# Patient Record
Sex: MALE | Race: WHITE | NOT HISPANIC OR LATINO | Employment: FULL TIME | URBAN - METROPOLITAN AREA
[De-identification: names, ages, dates, MRNs, and addresses within clinical notes are randomized per-mention and may not be internally consistent; named-entity substitution may affect disease eponyms.]

---

## 2017-03-11 ENCOUNTER — OFFICE VISIT (OUTPATIENT)
Dept: URGENT CARE | Facility: MEDICAL CENTER | Age: 52
End: 2017-03-11
Payer: COMMERCIAL

## 2017-03-11 PROCEDURE — 99203 OFFICE O/P NEW LOW 30 MIN: CPT

## 2017-03-16 ENCOUNTER — OFFICE VISIT (OUTPATIENT)
Dept: URGENT CARE | Facility: MEDICAL CENTER | Age: 52
End: 2017-03-16
Payer: COMMERCIAL

## 2017-03-16 PROCEDURE — 99213 OFFICE O/P EST LOW 20 MIN: CPT

## 2018-01-18 NOTE — PROGRESS NOTES
Assessment    1  Shingles (053 9) (B02 9)    Plan  Shingles    · ValACYclovir HCl - 1 GM Oral Tablet; TAKE 1 TABLET 3 TIMES DAILY    Discussion/Summary  Discussion Summary:   I prescribed valacyclovir 1 g 3 times a day for 7 days  I advised patient to avoid scratching vesicles since they are potentially contagious  He may take ibuprofen as needed for pain  Chief Complaint    1  Rash  Chief Complaint Free Text Note Form: Pt with red rash on chest below right breast and right mid back area for 3 days  Describes as "sensitive"  History of Present Illness  HPI: Patient here with a rash around rest area and right lower back for the last 3 days  First noticed a rash around the right periareolar area which seemed to look like bug bite  However since then it is spread  He describes some areas as having hypersensitivity  He is not applying any ointment to the rash  Denies any itching  Describes discomfort  Denies any fever or chills  Denies any fatigue  Hospital Based Practices Required Assessment:   Pain Assessment   the patient states they have pain  The pain is located in the rash chest and back  The patient describes the pain as dull  (on a scale of 0 to 10, the patient rates the pain at 4 )   Abuse And Domestic Violence Screen   Domestic violence screen not done today  Reason DV Screen not done: wife in room    Depression And Suicide Screen  Suicide screen not done today  Reason suicide screen not done: wife in room  Prefered Language is  english  Primary Language is  english  Review of Systems  Focused-Male:   Constitutional: no fever or chills, feels well, no tiredness, no recent weight loss or weight gain  Cardiovascular: no complaints of slow or fast heart rate, no chest pain, no palpitations, no leg claudication or lower extremity edema  Respiratory: no complaints of shortness of breath, no wheezing or cough, no dyspnea on exertion, no orthopnea or PND     Gastrointestinal: no complaints of abdominal pain, no constipation, no nausea or vomiting, no diarrhea or bloody stools  Past Medical History    1  No pertinent past medical history  Active Problems And Past Medical History Reviewed: The active problems and past medical history were reviewed and updated today  Social History    · Denies alcohol consumption (V49 89) (Z78 9)   · Never smoker    Current Meds   1  No Reported Medications Recorded  Medication List Reviewed: The medication list was reviewed and updated today  Allergies    1  No Known Drug Allergies    Vitals  Signs   Recorded: [id number] 05:43PM   Temperature: 97 8 F  Heart Rate: 104  Respiration: 20  Systolic: 230  Diastolic: 90  Height: 6 ft 1 in  Weight: 221 lb   BMI Calculated: 29 16  BSA Calculated: 2 25  O2 Saturation: 99  Pain Scale: 4    Physical Exam    Constitutional   General appearance: No acute distress, well appearing and well nourished  Pulmonary   Respiratory effort: No increased work of breathing or signs of respiratory distress  Auscultation of lungs: Clear to auscultation  Skin Right posterior thorax and right anterior chest reveals multiple vesicular lesion with erythematous base  Findings are consistent with shingles        Signatures   Electronically signed by : LELE Gayle ; Mar 11 2017  5:58PM EST                       (Author)

## 2019-12-16 ENCOUNTER — OFFICE VISIT (OUTPATIENT)
Dept: PODIATRY | Facility: CLINIC | Age: 54
End: 2019-12-16
Payer: COMMERCIAL

## 2019-12-16 VITALS
HEIGHT: 73 IN | BODY MASS INDEX: 28.1 KG/M2 | DIASTOLIC BLOOD PRESSURE: 82 MMHG | WEIGHT: 212 LBS | SYSTOLIC BLOOD PRESSURE: 172 MMHG | HEART RATE: 103 BPM

## 2019-12-16 DIAGNOSIS — E11.65 UNCONTROLLED TYPE 2 DIABETES MELLITUS WITH HYPERGLYCEMIA (HCC): Primary | ICD-10-CM

## 2019-12-16 PROCEDURE — 99213 OFFICE O/P EST LOW 20 MIN: CPT | Performed by: PODIATRIST

## 2019-12-16 RX ORDER — ATORVASTATIN CALCIUM 20 MG/1
20 TABLET, FILM COATED ORAL DAILY
COMMUNITY
Start: 2019-07-10 | End: 2020-07-09

## 2019-12-16 RX ORDER — LISINOPRIL 10 MG/1
10 TABLET ORAL DAILY
COMMUNITY
Start: 2019-07-10 | End: 2020-07-09

## 2019-12-16 RX ORDER — METFORMIN HYDROCHLORIDE 750 MG/1
750 TABLET, EXTENDED RELEASE ORAL
COMMUNITY
Start: 2019-11-26 | End: 2020-11-25

## 2019-12-16 RX ORDER — FENOFIBRATE 145 MG/1
145 TABLET, COATED ORAL DAILY
COMMUNITY
Start: 2019-07-10 | End: 2020-07-09

## 2019-12-16 NOTE — PATIENT INSTRUCTIONS
Foot Care for People with Diabetes   WHAT YOU NEED TO KNOW:   · Foot care helps protect your feet and prevent foot ulcers or sores  Long-term high blood sugar levels can damage the blood vessels and nerves in your legs and feet  This damage makes it hard to feel pressure, pain, temperature, and touch  You may not be able to feel a cut or sore, or shoes that are too tight  Foot care is needed to prevent serious problems, such as an infection or amputation  · Diabetes may cause your toes to become crooked or curved under  These changes may affect the way you walk and can lead to increased pressure on your foot  The pressure can decrease blood flow to your feet  Lack of blood flow increases your risk for a foot ulcer  Do not ignore small problems, such as dry skin or small wounds  These can become life-threatening over time without proper care  DISCHARGE INSTRUCTIONS:   Contact your healthcare provider if:   · Your feet become numb, weak, or hard to move  · You have pus draining from a sore on your foot  · You have a wound on your foot that gets bigger, deeper, or does not heal      · You see blisters, cuts, scratches, calluses, or sores on your foot  · You have a fever, and your feet become red, warm, and swollen  · Your toenails become thick, curled, or yellow  · You find it hard to check your feet because your vision is poor  · You have questions or concerns about your condition or care  Foot care:   · Check your feet each day  Look at your whole foot, including the bottom, and between and under your toes  Check for wounds, corns, and calluses  Use a mirror to see the bottom of your feet  The skin on your feet may be shiny, tight, or darker than normal  Your feet may also be cold and pale  Feel your feet by running your hands along the tops, bottoms, sides, and between your toes  Redness, swelling, and warmth are signs of blood flow problems that can lead to a foot ulcer   Do not try to remove corns or calluses yourself  · Wash your feet each day with soap and warm water  Do not use hot water, because this can injure your foot  Dry your feet gently with a towel after you wash them  Dry between and under your toes  · Apply lotion or a moisturizer on your dry feet  Ask your healthcare provider what lotions are best to use  Do not put lotion or moisturizer between your toes  · Cut your toenails correctly  File or cut your toenails straight across  Use a soft brush to clean around your toenails  If your toenails are very thick, you may need to have a healthcare provider or specialist cut them  · Protect your feet  Do not walk barefoot or wear your shoes without socks  Check your shoes for rocks or other objects that can hurt your feet  Wear cotton socks to help keep your feet dry  Wear socks without toe seams, or wear them with the seams inside out  Change your socks each day  Do not wear socks that are dirty or damp  · Wear shoes that fit well  Wear shoes that do not rub against any area of your feet  Your shoes should be ½ to ¾ inch (1 to 2 centimeters) longer than your feet  Your shoes should also have extra space around the widest part of your feet  Walking or athletic shoes with laces or straps that adjust are best  Ask your healthcare provider for help to choose shoes that fit you best  Ask him if you need to wear an insert, orthotic, or bandage on your feet  Follow up with your healthcare provider or foot specialist as directed: You will need to have your feet checked at least once a year  You may need a foot exam more often if you have nerve damage, foot deformities, or ulcers  Write down your questions so you remember to ask them during your visits  © 2017 [employer] Information is for End User's use only and may not be sold, redistributed or otherwise used for commercial purposes   All illustrations and images included in CareNotes® are the copyrighted property of [employer]  or Pierce Cavanaugh  The above information is an  only  It is not intended as medical advice for individual conditions or treatments  Talk to your doctor, nurse or pharmacist before following any medical regimen to see if it is safe and effective for you

## 2019-12-16 NOTE — PROGRESS NOTES
Assessment/Plan:       Diagnoses and all orders for this visit:    Uncontrolled type 2 diabetes mellitus with hyperglycemia ([address] )    Other orders  -     Albuterol Sulfate 108 (90 Base) MCG/ACT AEPB; Inhale 180 mcg every 4 (four) hours  -     atorvastatin (LIPITOR) 20 mg tablet; Take 20 mg by mouth daily  -     fenofibrate (TRICOR) 145 mg tablet; Take 145 mg by mouth daily  -     linaGLIPtin 5 MG TABS; Take 5 mg by mouth daily  -     lisinopril (ZESTRIL) 10 mg tablet; Take 10 mg by mouth daily  -     metFORMIN (GLUCOPHAGE-XR) 750 mg 24 hr tablet; Take 750 mg by mouth      -Educated on DM risk to lower extremities, proper shoe gear, and daily monitoring of feet    -Educated on A1C and the risks of poorly controlled Diabetes   -Discussed weight loss and suitable exercise regiment  - Patient has stable neurovascular status on his lower extremities  He is actively working with his PCP to get better BG control  Recommend annual DM foot examination for now unless new concerns arise  Subjective:      Patient ID: Li Martínez is a 53 y o  male  PAtient presents for annual DM foot exam  His A1C in July was 7 8 but went up to 10  He had stopped his metformin because of diarrhea side effects  He and his PCP are trying to re-establish a good medical regiment for his BG control  He denies numbness or burning in his feet  The following portions of the patient's history were reviewed and updated as appropriate: allergies, current medications, past family history, past medical history, past social history, past surgical history and problem list     Review of Systems      Objective:      BP (!) 172/82   Pulse 103   Ht 6' 1" (1 854 m)   Wt 96 2 kg (212 lb)   BMI 27 97 kg/m²          Physical Exam   Constitutional: He appears well-developed and well-nourished  No distress  Cardiovascular: Pulses are no weak pulses  Pulses:       Dorsalis pedis pulses are 2+ on the right side, and 2+ on the left side  Posterior tibial pulses are 2+ on the right side, and 2+ on the left side  Musculoskeletal:        Feet:    Feet:   Right Foot:   Skin Integrity: Negative for ulcer, callus or dry skin  Left Foot:   Skin Integrity: Negative for ulcer, callus or dry skin  Psychiatric: His mood appears not anxious  He is not agitated  Vitals reviewed  Diabetic Foot Exam    Patient's shoes and socks removed  Right Foot/Ankle   Right Foot Inspection  Skin Exam: skin intact no dry skin, no callus, no ulcer and no callus                          Toe Exam: no swelling, no tenderness, erythema and  no right toe deformity  Sensory   Vibration: diminished  Proprioception: intact   Monofilament testing: intact  Vascular  Capillary refills: < 3 seconds  The right DP pulse is 2+  The right PT pulse is 2+  Left Foot/Ankle  Left Foot Inspection  Skin Exam: skin intactno dry skin, no ulcer and no callus                         Toe Exam: no swelling, no tenderness, no erythema and no left toe deformity                   Sensory   Vibration: diminished  Proprioception: intact  Monofilament: intact  Vascular  Capillary refills: < 3 seconds  The left DP pulse is 2+  The left PT pulse is 2+  Assign Risk Category:  No deformity present; No loss of protective sensation;  No weak pulses       Risk: 0

## 2020-06-08 ENCOUNTER — OFFICE VISIT (OUTPATIENT)
Dept: PODIATRY | Facility: CLINIC | Age: 55
End: 2020-06-08
Payer: COMMERCIAL

## 2020-06-08 VITALS
WEIGHT: 224.2 LBS | BODY MASS INDEX: 29.72 KG/M2 | SYSTOLIC BLOOD PRESSURE: 140 MMHG | HEIGHT: 73 IN | HEART RATE: 76 BPM | DIASTOLIC BLOOD PRESSURE: 85 MMHG

## 2020-06-08 DIAGNOSIS — L03.032: Primary | ICD-10-CM

## 2020-06-08 PROCEDURE — 11730 AVULSION NAIL PLATE SIMPLE 1: CPT | Performed by: PODIATRIST

## 2020-06-08 PROCEDURE — 99212 OFFICE O/P EST SF 10 MIN: CPT | Performed by: PODIATRIST

## 2020-06-08 RX ORDER — LIDOCAINE HYDROCHLORIDE 10 MG/ML
3 INJECTION, SOLUTION INFILTRATION; PERINEURAL ONCE
Status: COMPLETED | OUTPATIENT
Start: 2020-06-08 | End: 2020-06-08

## 2020-06-08 RX ADMIN — LIDOCAINE HYDROCHLORIDE 3 ML: 10 INJECTION, SOLUTION INFILTRATION; PERINEURAL at 09:19

## 2020-06-29 ENCOUNTER — EVALUATION (OUTPATIENT)
Dept: PHYSICAL THERAPY | Facility: MEDICAL CENTER | Age: 55
End: 2020-06-29
Payer: COMMERCIAL

## 2020-06-29 ENCOUNTER — TRANSCRIBE ORDERS (OUTPATIENT)
Dept: PHYSICAL THERAPY | Facility: MEDICAL CENTER | Age: 55
End: 2020-06-29

## 2020-06-29 DIAGNOSIS — M75.02 ADHESIVE BURSITIS OF LEFT SHOULDER: Primary | ICD-10-CM

## 2020-06-29 PROCEDURE — 97140 MANUAL THERAPY 1/> REGIONS: CPT | Performed by: PHYSICAL THERAPIST

## 2020-06-29 PROCEDURE — 97161 PT EVAL LOW COMPLEX 20 MIN: CPT | Performed by: PHYSICAL THERAPIST

## 2020-06-29 PROCEDURE — 97110 THERAPEUTIC EXERCISES: CPT | Performed by: PHYSICAL THERAPIST

## 2020-07-02 ENCOUNTER — OFFICE VISIT (OUTPATIENT)
Dept: PHYSICAL THERAPY | Facility: MEDICAL CENTER | Age: 55
End: 2020-07-02
Payer: COMMERCIAL

## 2020-07-02 DIAGNOSIS — M75.02 ADHESIVE BURSITIS OF LEFT SHOULDER: Primary | ICD-10-CM

## 2020-07-02 PROCEDURE — 97110 THERAPEUTIC EXERCISES: CPT | Performed by: PHYSICAL THERAPIST

## 2020-07-02 PROCEDURE — 97140 MANUAL THERAPY 1/> REGIONS: CPT | Performed by: PHYSICAL THERAPIST

## 2020-07-02 PROCEDURE — 97112 NEUROMUSCULAR REEDUCATION: CPT | Performed by: PHYSICAL THERAPIST

## 2020-07-02 NOTE — PROGRESS NOTES
Daily Note     Today's date: 2020  Patient name: Basim Friend  : 1965  MRN: [id number]  Referring provider: Don Vásquez  Dx:   Encounter Diagnosis     ICD-10-CM    1  Adhesive bursitis of left shoulder M75 02          Subjective: Pt reports that he tweaked his shoulder when he dropped a 70# box today  He notes that up until that time his shoulder was feeling better  Objective: See treatment diary below    Pt was able to maintain the improvements in PROM he achieved during his last PT visit  Assessment: Tolerated treatment well  Patient demonstrated fatigue post treatment, exhibited good technique with therapeutic exercises and would benefit from continued PT  Plan: Continue per plan of care        Precautions: h/o diabetes    Daily Treatment Diary     Manual             [facility] jt mobs all planes  HK            PROM HK                                                       Exercise Diary             UBE NV 3F/3B           Scap 4 IP            UE alphabet NV Sup  2X           SL ER NV 3x15           Prone pro/ret NV 3x10           Pulleys  6'                                                                                                                                                                                                     Modalities              MH 15'

## 2020-07-06 ENCOUNTER — OFFICE VISIT (OUTPATIENT)
Dept: PHYSICAL THERAPY | Facility: MEDICAL CENTER | Age: 55
End: 2020-07-06
Payer: COMMERCIAL

## 2020-07-06 DIAGNOSIS — M75.02 ADHESIVE BURSITIS OF LEFT SHOULDER: Primary | ICD-10-CM

## 2020-07-06 PROCEDURE — 97110 THERAPEUTIC EXERCISES: CPT

## 2020-07-06 PROCEDURE — 97140 MANUAL THERAPY 1/> REGIONS: CPT

## 2020-07-06 PROCEDURE — 97112 NEUROMUSCULAR REEDUCATION: CPT

## 2020-07-06 NOTE — PROGRESS NOTES
Daily Note     Today's date: 2020  Patient name: Dorothea Oliveira  : 1965  MRN: [id number]  Referring provider: Erik Buck  Dx:   Encounter Diagnosis     ICD-10-CM    1  Adhesive bursitis of left shoulder M75 02                   Subjective: Pt reports that his shoulder felt good through the weekend, but felt stiff after waking today  Objective: See treatment diary below      Assessment: Tolerated treatment well  Patient demonstrated fatigue post treatment, exhibited good technique with therapeutic exercises and would benefit from continued PT  Issue TB and ex's at [place]  Did not issue today 2* a moderate amount of cuing required w/these ex's  Plan: Continue per plan of care        Precautions: h/o diabetes    Daily Treatment Diary     Manual            [facility] jt mobs all planes  HK            PROM HK  KO                                                     Exercise Diary            UBE NV 3F/3B 3F/3B          Scap 4 IP  Red  3x10          UE alphabet NV Sup  2X Sup  3x          SL ER NV 3x15 3x15          Prone pro/ret NV 3x10 3x10          Pulleys  6' 6'                                                                                                                                                                                                    Modalities              MH 15'

## 2020-07-13 ENCOUNTER — OFFICE VISIT (OUTPATIENT)
Dept: PHYSICAL THERAPY | Facility: MEDICAL CENTER | Age: 55
End: 2020-07-13
Payer: COMMERCIAL

## 2020-07-13 DIAGNOSIS — M75.02 ADHESIVE BURSITIS OF LEFT SHOULDER: Primary | ICD-10-CM

## 2020-07-13 PROCEDURE — 97112 NEUROMUSCULAR REEDUCATION: CPT

## 2020-07-13 PROCEDURE — 97140 MANUAL THERAPY 1/> REGIONS: CPT

## 2020-07-13 PROCEDURE — 97110 THERAPEUTIC EXERCISES: CPT

## 2020-07-13 NOTE — PROGRESS NOTES
Daily Note     Today's date: 2020  Patient name: Trevor Mcdaniel  : 1965  MRN: [id number]  Referring provider: Veronica Levine  Dx:   Encounter Diagnosis     ICD-10-CM    1  Adhesive bursitis of left shoulder M75 02                   Subjective: Pt reports that his mobility and strength are improving  Objective: See treatment diary below      Assessment: Tolerated treatment well  Patient demonstrated fatigue post treatment, exhibited good technique with therapeutic exercises and would benefit from continued PT  Pt's mobility is improving and is making progress towards his goals  Plan: Continue per plan of care        Precautions: h/o diabetes    Daily Treatment Diary     Manual           [address] [facility] jt mobs all planes  HK            PROM HK  KO KO                                                    Exercise Diary           UBE NV 3F/3B 3F/3B 3F/3B         Scap 4 IP  Red  3x10 Red  3x15           UE alphabet NV Sup  2X Sup  3x Sup  3x         SL ER NV 3x15 3x15 1#  3x10         Prone pro/ret NV 3x10 3x10 3x10         Pulleys  6' 6' 6'         Wall slides    Supine  3x10                                                                                                                                                                                      Modalities              MH 15'

## 2020-07-16 ENCOUNTER — OFFICE VISIT (OUTPATIENT)
Dept: PHYSICAL THERAPY | Facility: MEDICAL CENTER | Age: 55
End: 2020-07-16
Payer: COMMERCIAL

## 2020-07-16 DIAGNOSIS — M75.02 ADHESIVE BURSITIS OF LEFT SHOULDER: Primary | ICD-10-CM

## 2020-07-16 PROCEDURE — 97110 THERAPEUTIC EXERCISES: CPT

## 2020-07-16 PROCEDURE — 97140 MANUAL THERAPY 1/> REGIONS: CPT

## 2020-07-16 PROCEDURE — 97112 NEUROMUSCULAR REEDUCATION: CPT

## 2020-07-16 NOTE — PROGRESS NOTES
Daily Note     Today's date: 2020  Patient name: Aminata See  : 1965  MRN: [id number]  Referring provider: Abimbola Chen  Dx:   Encounter Diagnosis     ICD-10-CM    1  Adhesive bursitis of left shoulder M75 02                   Subjective: Pt states that his shoulder is feeling ok and no changes since LV  Objective: See treatment diary below      Assessment: Tolerated treatment well  Patient demonstrated fatigue post treatment, exhibited good technique with therapeutic exercises and would benefit from continued PT  Pt's mobility is improving gradually  Progress ex's at NV if able  Plan: Continue per plan of care        Precautions: h/o diabetes    Daily Treatment Diary     Manual          [address] [facility] j mobs all planes  HK            PROM HK  KO KO KO                                                   Exercise Diary          UBE NV 3F/3B 3F/3B 3F/3B 3F/3B        Scap 4 IP  Red  3x10 Red  3x15   Green  3x10        UE alphabet NV Sup  2X Sup  3x Sup  3x Sup  3x  1#        SL ER NV 3x15 3x15 1#  3x10 1#  3x10        Prone pro/ret NV 3x10 3x10 3x10 3x10        Pulleys  6' 6' 6' 6'        Wall slides    Supine  3x10 Supine  3x10        Prone raises     3x10                                                                                                                                                                        Modalities              MH 15'

## 2020-07-20 ENCOUNTER — OFFICE VISIT (OUTPATIENT)
Dept: PHYSICAL THERAPY | Facility: MEDICAL CENTER | Age: 55
End: 2020-07-20
Payer: COMMERCIAL

## 2020-07-20 DIAGNOSIS — M75.02 ADHESIVE BURSITIS OF LEFT SHOULDER: Primary | ICD-10-CM

## 2020-07-20 PROCEDURE — 97110 THERAPEUTIC EXERCISES: CPT | Performed by: PHYSICAL THERAPIST

## 2020-07-20 PROCEDURE — 97140 MANUAL THERAPY 1/> REGIONS: CPT | Performed by: PHYSICAL THERAPIST

## 2020-07-20 PROCEDURE — 97112 NEUROMUSCULAR REEDUCATION: CPT | Performed by: PHYSICAL THERAPIST

## 2020-07-20 NOTE — PROGRESS NOTES
Daily Note     Today's date: 2020  Patient name: Lalit Sellers  : 1965  MRN: [id number]  Referring provider: Joaquín Bird  Dx:   Encounter Diagnosis     ICD-10-CM    1  Adhesive bursitis of left shoulder M75 02        Subjective: Pt reports that his shoulder ROM is improved and his shoulder is feeling pretty good  Objective: See treatment diary below    + L AC jt hypomobility     Assessment: Tolerated treatment well  Patient demonstrated fatigue post treatment, exhibited good technique with therapeutic exercises and would benefit from continued PT  Plan: Continue per plan of care        Precautions: h/o diabetes    Daily Treatment Diary     Manual         [address] jt mobs all planes  HK     HK       PROM HK  KO KO KO HK       AC jt mobs      HK                                     Exercise Diary         UBE NV 3F/3B 3F/3B 3F/3B 3F/3B 3F/3B       Scap 4 IP  Red  3x10 Red  3x15   Green  3x10 Green  3x15       UE alphabet NV Sup  2X Sup  3x Sup  3x Sup  3x  1# Sup  3X  2#       SL ER NV 3x15 3x15 1#  3x10 1#  3x10 2#  3x10       Prone pro/ret NV 3x10 3x10 3x10 3x10 3x10       Pulleys  6' 6' 6' 6' 6'       Wall slides    Supine  3x10 Supine  3x10 Sup  3x12       Prone raises     3x10 3x10                                                                                                                                                                       Modalities              MH 15'

## 2020-07-23 ENCOUNTER — OFFICE VISIT (OUTPATIENT)
Dept: PHYSICAL THERAPY | Facility: MEDICAL CENTER | Age: 55
End: 2020-07-23
Payer: COMMERCIAL

## 2020-07-23 DIAGNOSIS — M75.02 ADHESIVE BURSITIS OF LEFT SHOULDER: Primary | ICD-10-CM

## 2020-07-23 PROCEDURE — 97140 MANUAL THERAPY 1/> REGIONS: CPT

## 2020-07-23 PROCEDURE — 97110 THERAPEUTIC EXERCISES: CPT

## 2020-07-23 PROCEDURE — 97112 NEUROMUSCULAR REEDUCATION: CPT

## 2020-07-23 NOTE — PROGRESS NOTES
Daily Note     Today's date: 2020  Patient name: Dorothea Oliveira  : 1965  MRN: [id number]  Referring provider: Glen Todd  Dx:   Encounter Diagnosis     ICD-10-CM    1  Adhesive bursitis of left shoulder M75 02                   Subjective: Pt reports that his shoulder is beginning to feel better  Objective: See treatment diary below      Assessment: Tolerated treatment well  Patient demonstrated fatigue post treatment, exhibited good technique with therapeutic exercises and would benefit from continued PTPt is responding well to current tx plan  Pts scapular control and mobility are improving  Plan: Continue per plan of care        Precautions: h/o diabetes    Daily Treatment Diary     Manual        [facility] jt mobs all planes  HK     HK       PROM HK  KO KO KO HK KO      AC jt mobs      HK                                     Exercise Diary        UBE NV 3F/3B 3F/3B 3F/3B 3F/3B 3F/3B 3F/3B      Scap 4 IP  Red  3x10 Red  3x15   Green  3x10 Green  3x15 Green  3x15      UE alphabet NV Sup  2X Sup  3x Sup  3x Sup  3x  1# Sup  3X  2# Sup  3x  2#      SL ER NV 3x15 3x15 1#  3x10 1#  3x10 2#  3x10 2#  3x12      Prone pro/ret NV 3x10 3x10 3x10 3x10 3x10 3x10      Pulleys  6' 6' 6' 6' 6' 6'      Wall slides    Supine  3x10 Supine  3x10 Sup  3x12 Sup  3x12      Prone raises     3x10 3x10 3x10                                                                                                                                                                      Modalities              MH 15'

## 2020-07-27 ENCOUNTER — OFFICE VISIT (OUTPATIENT)
Dept: PHYSICAL THERAPY | Facility: MEDICAL CENTER | Age: 55
End: 2020-07-27
Payer: COMMERCIAL

## 2020-07-27 DIAGNOSIS — M75.02 ADHESIVE BURSITIS OF LEFT SHOULDER: Primary | ICD-10-CM

## 2020-07-27 PROCEDURE — 97110 THERAPEUTIC EXERCISES: CPT | Performed by: PHYSICAL THERAPIST

## 2020-07-27 PROCEDURE — 97112 NEUROMUSCULAR REEDUCATION: CPT | Performed by: PHYSICAL THERAPIST

## 2020-07-27 PROCEDURE — 97140 MANUAL THERAPY 1/> REGIONS: CPT | Performed by: PHYSICAL THERAPIST

## 2020-07-27 NOTE — PROGRESS NOTES
Daily Note     Today's date: 2020  Patient name: Lin Mendosa  : 1965  MRN: [id number]  Referring provider: Syed Dixon  Dx:   Encounter Diagnosis     ICD-10-CM    1  Adhesive bursitis of left shoulder M75 02                   Subjective: Pt reports that his shoulder is feeling good  He feels like his motion is significantly improved and he is not having any pain  Objective: See treatment diary below      Assessment: Tolerated treatment well  Patient demonstrated fatigue post treatment, exhibited good technique with therapeutic exercises and would benefit from continued PT  Pt is doing very well with the current tx plan  Plan: Continue per plan of care        Precautions: h/o diabetes    Daily Treatment Diary     Manual       [facility] jt mobs all planes  HK     HK  HK     PROM [address] HK KO HK     AC jt mobs      HK  HK                                   Exercise Diary       UBE NV 3F/3B 3F/3B 3F/3B 3F/3B 3F/3B 3F/3B 3F/3B     Scap 4 IP  Red  3x10 Red  3x15   Green  3x10 Green  3x15 Green  3x15 Blue  3x10     UE alphabet NV Sup  2X Sup  3x Sup  3x Sup  3x  1# Sup  3X  2# Sup  3x  2# Stand  3x     SL ER NV 3x15 3x15 1#  3x10 1#  3x10 2#  3x10 2#  3x12 2#  3x12     Prone pro/ret NV 3x10 3x10 3x10 3x10 3x10 3x10 3x10     Pulleys  6' 6' 6' 6' 6' 6' 6'     Wall slides    Supine  3x10 Supine  3x10 Sup  3x12 Sup  3x12 Sup  3x12     Prone raises     3x10 3x10 3x10 3x10                                                                                                                                                                     Modalities              MH 15

## 2020-07-30 ENCOUNTER — OFFICE VISIT (OUTPATIENT)
Dept: PHYSICAL THERAPY | Facility: MEDICAL CENTER | Age: 55
End: 2020-07-30
Payer: COMMERCIAL

## 2020-07-30 DIAGNOSIS — M75.02 ADHESIVE BURSITIS OF LEFT SHOULDER: Primary | ICD-10-CM

## 2020-07-30 PROCEDURE — 97140 MANUAL THERAPY 1/> REGIONS: CPT | Performed by: PHYSICAL THERAPIST

## 2020-07-30 PROCEDURE — 97110 THERAPEUTIC EXERCISES: CPT | Performed by: PHYSICAL THERAPIST

## 2020-07-30 NOTE — PROGRESS NOTES
Daily Note     Today's date: 2020  Patient name: Collin Green  : 1965  MRN: [id number]  Referring provider: Colonel Camejo  Dx:   Encounter Diagnosis     ICD-10-CM    1  Adhesive bursitis of left shoulder M75 02             Subjective:  Pt reports that his shoulders are very fatigued today after moving a lot of stuff today at work  Objective: See treatment diary below    Ther ex was not performed today secondary to pts reports of extreme fatigue  Assessment: Tolerated treatment well  Patient would benefit from continued PT      Plan: Continue per plan of care        Precautions: h/o diabetes    Daily Treatment Diary     Manual      [address] jt mobs all planes  HK     HK  HK HK    PROM HK  KO KO KO HK KO HK HK    AC jt mobs      HK  HK NP                                  Exercise Diary      UBE NV 3F/3B 3F/3B 3F/3B 3F/3B 3F/3B 3F/3B 3F/3B 3F/3B    Scap 4 IP  Red  3x10 Red  3x15   Green  3x10 Green  3x15 Green  3x15 Blue  3x10 NP    UE alphabet NV Sup  2X Sup  3x Sup  3x Sup  3x  1# Sup  3X  2# Sup  3x  2# Stand  3x NP    SL ER NV 3x15 3x15 1#  3x10 1#  3x10 2#  3x10 2#  3x12 2#  3x12 NP    Prone pro/ret NV 3x10 3x10 3x10 3x10 3x10 3x10 3x10 NP    Pulleys  6' 6' 6' 6' 6' 6' 6 6'    Wall slides    Supine  3x10 Supine  3x10 Sup  3x12 Sup  3x12 Sup  3x12 NP    Prone raises     3x10 3x10 3x10 3x10 NP                                                                                                                                                                    Modalities              MH 15'

## 2020-08-03 ENCOUNTER — OFFICE VISIT (OUTPATIENT)
Dept: PHYSICAL THERAPY | Facility: MEDICAL CENTER | Age: 55
End: 2020-08-03
Payer: COMMERCIAL

## 2020-08-03 DIAGNOSIS — M75.02 ADHESIVE BURSITIS OF LEFT SHOULDER: Primary | ICD-10-CM

## 2020-08-03 PROCEDURE — 97110 THERAPEUTIC EXERCISES: CPT

## 2020-08-03 PROCEDURE — 97112 NEUROMUSCULAR REEDUCATION: CPT

## 2020-08-03 PROCEDURE — 97140 MANUAL THERAPY 1/> REGIONS: CPT

## 2020-08-03 NOTE — PROGRESS NOTES
Daily Note     Today's date: 8/3/2020  Patient name: Dang Snow  : 1965  MRN: [id number]  Referring provider: Eros Barbosa  Dx:   Encounter Diagnosis     ICD-10-CM    1  Adhesive bursitis of left shoulder  M75 02                   Subjective: Pt reports that his shoulder is a little sore, but not as sore as LV  Objective: See treatment diary below      Assessment: Tolerated treatment well  Patient demonstrated fatigue post treatment, exhibited good technique with therapeutic exercises and would benefit from continued PT  Pt is responding well to current tx plan  Plan: Continue per plan of care        Precautions: h/o diabetes    Daily Treatment Diary     Manual   8/3   [address] all planes  HK     HK  HK HK    PROM HK  KO KO KO HK KO HK HK KO   AC jt mobs      HK  HK NP                                  Exercise Diary   8/3   UBE NV 3F/3B 3F/3B 3F/3B 3F/3B 3F/3B 3F/3B 3F/3B 3F/3B 3F/3B   Scap 4 IP  Red  3x10 Red  3x15   Green  3x10 Green  3x15 Green  3x15 Blue  3x10 NP Blue  3x10   UE alphabet NV Sup  2X Sup  3x Sup  3x Sup  3x  1# Sup  3X  2# Sup  3x  2# Stand  3x NP Stand  3x   SL ER NV 3x15 3x15 1#  3x10 1#  3x10 2#  3x10 2#  3x12 2#  3x12 NP 2#  3x12   Prone pro/ret NV 3x10 3x10 3x10 3x10 3x10 3x10 3x10 NP 3x10   Pulleys  6' 6' 6' 6' 6' 6' 6 6   Wall slides    Supine  3x10 Supine  3x10 Sup  3x12 Sup  3x12 Sup  3x12 NP Sup  3x12   Prone raises     3x10 3x10 3x10 3x10 NP 3x10                                                                                                                                                                   Modalities              MH 15

## 2020-08-06 ENCOUNTER — OFFICE VISIT (OUTPATIENT)
Dept: PHYSICAL THERAPY | Facility: MEDICAL CENTER | Age: 55
End: 2020-08-06
Payer: COMMERCIAL

## 2020-08-06 DIAGNOSIS — M75.02 ADHESIVE BURSITIS OF LEFT SHOULDER: Primary | ICD-10-CM

## 2020-08-06 PROCEDURE — 97112 NEUROMUSCULAR REEDUCATION: CPT

## 2020-08-06 PROCEDURE — 97110 THERAPEUTIC EXERCISES: CPT

## 2020-08-06 PROCEDURE — 97140 MANUAL THERAPY 1/> REGIONS: CPT

## 2020-08-06 NOTE — PROGRESS NOTES
Daily Note     Today's date: 2020  Patient name: Lin Mendosa  : 1965  MRN: [id number]  Referring provider: Silke Hannah  Dx:   Encounter Diagnosis     ICD-10-CM    1  Adhesive bursitis of left shoulder  M75 02                   Subjective: Pt states that his shoulder continues to progress and comments that the Dr  Was very happy with his progress  Objective: See treatment diary below      Assessment: Tolerated treatment well  Patient demonstrated fatigue post treatment, exhibited good technique with therapeutic exercises and would benefit from continued PT  Pt's mobility has improved a great deal and improved scapular activation as well with ex's  Plan: Continue per plan of care        Precautions: h/o diabetes    Daily Treatment Diary     Manual              GH jt mobs all planes              PROM             AC jt mobs                                           Exercise Diary              UBE 3F/3B            Scap 4 Blue  3x12            UE alphabet Stand  3x            SL ER 2#  3x15            Prone pro/ret 3x10            Pulleys 6'            Wall slides Stand  3x10            Prone raises 3x10                                                                                                                                                                            Modalities

## 2020-08-10 ENCOUNTER — OFFICE VISIT (OUTPATIENT)
Dept: PHYSICAL THERAPY | Facility: MEDICAL CENTER | Age: 55
End: 2020-08-10
Payer: COMMERCIAL

## 2020-08-10 DIAGNOSIS — M75.02 ADHESIVE BURSITIS OF LEFT SHOULDER: Primary | ICD-10-CM

## 2020-08-10 PROCEDURE — 97140 MANUAL THERAPY 1/> REGIONS: CPT

## 2020-08-10 PROCEDURE — 97110 THERAPEUTIC EXERCISES: CPT

## 2020-08-10 PROCEDURE — 97112 NEUROMUSCULAR REEDUCATION: CPT

## 2020-08-10 NOTE — PROGRESS NOTES
Daily Note     Today's date: 8/10/2020  Patient name: Lin Mendosa  : 1965  MRN: [id number]  Referring provider: Silke Hannah  Dx:   Encounter Diagnosis     ICD-10-CM    1  Adhesive bursitis of left shoulder  M75 02                   Subjective: Pt reports his shoulder is feeling better  Objective: See treatment diary below      Assessment: Tolerated treatment well  Patient demonstrated fatigue post treatment, exhibited good technique with therapeutic exercises and would benefit from continued PT        Plan: Continue per plan of care  Potential discharge next visit       Precautions: h/o diabetes    Daily Treatment Diary     Manual  8/6 8/10           GH jt mobs all planes              PROM  KO           AC jt mobs                                           Exercise Diary  8/6 8/10           UBE 3F/3B 3F/3B           Scap 4 Blue  3x12 Blue  3x15           UE alphabet Stand  3x Stand  3x  1#           SL ER 2#  3x15 3#  3x15           Prone pro/ret 3x10 3x10           Pulleys 6' 6'           Wall slides Stand  3x10 Stand  3x10           Prone raises 3x10 3x10                                                                                                                                                                           Modalities

## 2020-08-13 ENCOUNTER — OFFICE VISIT (OUTPATIENT)
Dept: PHYSICAL THERAPY | Facility: MEDICAL CENTER | Age: 55
End: 2020-08-13
Payer: COMMERCIAL

## 2020-08-13 DIAGNOSIS — M75.02 ADHESIVE BURSITIS OF LEFT SHOULDER: Primary | ICD-10-CM

## 2020-08-13 PROCEDURE — 97140 MANUAL THERAPY 1/> REGIONS: CPT

## 2020-08-13 PROCEDURE — 97110 THERAPEUTIC EXERCISES: CPT

## 2020-08-13 PROCEDURE — 97112 NEUROMUSCULAR REEDUCATION: CPT

## 2020-08-13 NOTE — PROGRESS NOTES
Daily Note     Today's date: 2020  Patient name: Jamilah Israel  : 1965  MRN: [id number]  Referring provider: Daniel Funez  Dx:   Encounter Diagnosis     ICD-10-CM    1  Adhesive bursitis of left shoulder  M75 02                   Subjective: Pt reports that he is happy with his progress made in PT and can perform activities without any pain now  Objective: See treatment diary below      Assessment: Tolerated treatment well  Patient has improved a great deal with his PROM    Plan: Pt dc'd to hep after todays visit        Precautions: h/o diabetes    Daily Treatment Diary     Manual  8/6 8/10 8/13          GH jt mobs all planes              PROM  KO KO          AC jt mobs                                           Exercise Diary  8/6 8/10 8/13          UBE 3F/3B 3F/3B 3F/3B          Scap 4 Blue  3x12 Blue  3x15 Blue  3x15          UE alphabet Stand  3x Stand  3x  1# Stand  3x  1#          SL ER 2#  3x15 3#  3x15 3#  3x15          Prone pro/ret 3x10 3x10 3x10          Pulleys 6' 6' 6'          Wall slides Stand  3x10 Stand  3x10 Stand  1#  3x10          Prone raises 3x10 3x10 3x10                                                                                                                                                                          Modalities

## 2020-12-14 ENCOUNTER — OFFICE VISIT (OUTPATIENT)
Dept: PODIATRY | Facility: CLINIC | Age: 55
End: 2020-12-14
Payer: COMMERCIAL

## 2020-12-14 VITALS — HEIGHT: 73 IN | WEIGHT: 233 LBS | BODY MASS INDEX: 30.88 KG/M2

## 2020-12-14 DIAGNOSIS — E11.9 TYPE 2 DIABETES MELLITUS WITHOUT COMPLICATION, WITHOUT LONG-TERM CURRENT USE OF INSULIN (HCC): Primary | ICD-10-CM

## 2020-12-14 PROCEDURE — 99213 OFFICE O/P EST LOW 20 MIN: CPT | Performed by: PODIATRIST

## 2020-12-14 RX ORDER — LISINOPRIL 20 MG/1
TABLET ORAL
COMMUNITY
Start: 2020-12-13

## 2020-12-14 RX ORDER — FENOFIBRATE 120 MG/1
TABLET ORAL
COMMUNITY

## 2020-12-14 RX ORDER — BLOOD-GLUCOSE METER
EACH MISCELLANEOUS
COMMUNITY

## 2020-12-14 RX ORDER — LINAGLIPTIN 5 MG/1
5 TABLET, FILM COATED ORAL DAILY
COMMUNITY
Start: 2020-09-21

## 2020-12-14 RX ORDER — HYDROCHLOROTHIAZIDE 12.5 MG/1
TABLET ORAL
COMMUNITY
Start: 2020-03-19

## 2020-12-14 RX ORDER — METFORMIN HYDROCHLORIDE 750 MG/1
750 TABLET, EXTENDED RELEASE ORAL
COMMUNITY
Start: 2020-09-21

## 2020-12-14 RX ORDER — SILDENAFIL 100 MG/1
TABLET, FILM COATED ORAL
COMMUNITY
Start: 2020-11-01

## 2020-12-14 RX ORDER — GLIPIZIDE 5 MG/1
TABLET ORAL
COMMUNITY
Start: 2020-03-19

## 2021-12-15 ENCOUNTER — OFFICE VISIT (OUTPATIENT)
Dept: PODIATRY | Facility: CLINIC | Age: 56
End: 2021-12-15
Payer: COMMERCIAL

## 2021-12-15 VITALS
HEIGHT: 72 IN | BODY MASS INDEX: 32.1 KG/M2 | WEIGHT: 237 LBS | DIASTOLIC BLOOD PRESSURE: 83 MMHG | HEART RATE: 101 BPM | SYSTOLIC BLOOD PRESSURE: 159 MMHG

## 2021-12-15 DIAGNOSIS — E11.9 TYPE 2 DIABETES MELLITUS WITHOUT COMPLICATION, WITHOUT LONG-TERM CURRENT USE OF INSULIN (HCC): Primary | ICD-10-CM

## 2021-12-15 DIAGNOSIS — L03.031 PARONYCHIA, TOE, RIGHT: ICD-10-CM

## 2021-12-15 PROCEDURE — 99213 OFFICE O/P EST LOW 20 MIN: CPT | Performed by: PODIATRIST

## 2021-12-15 RX ORDER — ATORVASTATIN CALCIUM 20 MG/1
TABLET, FILM COATED ORAL
COMMUNITY

## 2021-12-15 RX ORDER — CEPHALEXIN 500 MG/1
500 CAPSULE ORAL EVERY 6 HOURS SCHEDULED
Qty: 20 CAPSULE | Refills: 0 | Status: SHIPPED | OUTPATIENT
Start: 2021-12-15 | End: 2021-12-20

## 2022-12-13 ENCOUNTER — OFFICE VISIT (OUTPATIENT)
Dept: PODIATRY | Facility: CLINIC | Age: 57
End: 2022-12-13

## 2022-12-13 VITALS
HEART RATE: 86 BPM | DIASTOLIC BLOOD PRESSURE: 85 MMHG | BODY MASS INDEX: 32.1 KG/M2 | SYSTOLIC BLOOD PRESSURE: 150 MMHG | HEIGHT: 72 IN | WEIGHT: 237 LBS

## 2022-12-13 DIAGNOSIS — E11.9 TYPE 2 DIABETES MELLITUS WITHOUT COMPLICATION, WITHOUT LONG-TERM CURRENT USE OF INSULIN (HCC): Primary | ICD-10-CM

## 2022-12-13 DIAGNOSIS — L03.031 PARONYCHIA, TOE, RIGHT: ICD-10-CM

## 2022-12-13 RX ORDER — LIDOCAINE HYDROCHLORIDE 10 MG/ML
3 INJECTION, SOLUTION INFILTRATION; PERINEURAL ONCE
Status: COMPLETED | OUTPATIENT
Start: 2022-12-13 | End: 2022-12-13

## 2022-12-13 RX ORDER — CEPHALEXIN 500 MG/1
500 CAPSULE ORAL EVERY 6 HOURS SCHEDULED
Qty: 20 CAPSULE | Refills: 0 | Status: SHIPPED | OUTPATIENT
Start: 2022-12-13 | End: 2022-12-18

## 2022-12-13 RX ADMIN — LIDOCAINE HYDROCHLORIDE 3 ML: 10 INJECTION, SOLUTION INFILTRATION; PERINEURAL at 14:53

## 2022-12-13 NOTE — PROGRESS NOTES
Assessment/Plan:       Diagnoses and all orders for this visit:    Type 2 diabetes mellitus without complication, without long-term current use of insulin (HCC)    Paronychia, toe, right  -     lidocaine (XYLOCAINE) 1 % injection 3 mL  -     cephalexin (KEFLEX) 500 mg capsule; Take 1 capsule (500 mg total) by mouth every 6 (six) hours for 5 days  -     Nail removal        Diagnosis and options discussed with patient  Patient agreeable to the plan as stated below    -Educated on DM risk to lower extremities, proper shoe gear, and daily monitoring of feet    -Educated on A1C and the risks of poorly controlled Diabetes  Reviewed recent A1C, 6 4  -Discussed weight loss and suitable exercise regiment  -DM well controlled, healthy N/V status in feet  Annual DM foot exams unless new concerns arise  Nail removal    Date/Time: 12/13/2022 2:39 PM  Performed by: Benoit Moctezuma DPM  Authorized by: Benoit Moctezuma DPM     Patient location:  [facility]inic[facility] Protocol:  Consent: Verbal consent obtained  Risks and benefits: risks, benefits and alternatives were discussed  Consent given by: patient  Time out: Immediately prior to procedure a "time out" was called to verify the correct patient, procedure, equipment, support staff and site/side marked as required  Timeout called at: 12/13/2022 2:39 PM   Patient understanding: patient states understanding of the procedure being performed      Location:     Foot:  R big toe  Pre-procedure details:     Skin preparation:  Betadine  Anesthesia (see MAR for exact dosages): Anesthesia method:  Local infiltration    Local anesthetic:  Lidocaine 1% w/o epi  Nail Removal:     Nail removed:  Complete  Ingrown nail:     Nail matrix removed or ablated:  None  Post-procedure details:     Dressing:  4x4 sterile gauze, antibiotic ointment and gauze roll    Patient tolerance of procedure:   Tolerated well, no immediate complications          Subjective:      Patient ID: Omayra Rae Rowan Arteaga is a 61 y o  male  Patient is here for ingrown toenail  The right hallux nail often gets infected  Last year he went on an antibiotic and it was fine until last week  He is hoping for a nail avulsion today  The following portions of the patient's history were reviewed and updated as appropriate: allergies, current medications, past family history, past medical history, past social history, past surgical history and problem list     Review of Systems    Constitutional: Negative  HENT: Negative for sinus pressure and sinus pain  Respiratory: Negative for cough and shortness of breath  Cardiovascular: Negative for chest pain and leg swelling  Gastrointestinal: Negative for diarrhea, nausea and vomiting  Musculoskeletal: Negative for arthralgias, gait problem, joint swelling and myalgias  Skin:ingrown toenail  Neurological: Negative for weakness, numbness and headaches  Psychiatric/Behavioral: The patient is not nervous/anxious  Objective:      /85   Pulse 86   Ht 6' (1 829 m)   Wt 108 kg (237 lb)   BMI 32 14 kg/m²     HEMOGLOBIN A1C  Order: [id number]   Status: Final result      Next appt: None     Component Ref Range & Units 11/7/22 10:00 AM 3/19/22  9:31 AM 9/20/21  8:02 AM 3/6/21  7:43 AM 6/27/20  8:55 AM 3/7/20  8:42 AM 11/23/19 10:47 PM   Hemoglobin A1C <5 7 % 6 4 High   6 8 High  CM  6 4 High  CM  6 7 High  CM  5 9 High  CM  7 3 High  CM  10 1 High  CM    Comment: Reference Range   Non-diabetic                     <5 7   Pre-diabetic                     5 7-6 4   Diabetic                         >=6 5   ADA target for diabetic control  <=7   eAG, EST AVG Glucose <154 mg/dL 137  148  137  146                 Physical Exam  Vitals reviewed  Cardiovascular:      Rate and Rhythm: Normal rate  Pulses: Normal pulses  no weak pulses          Dorsalis pedis pulses are 2+ on the right side and 2+ on the left side          Posterior tibial pulses are 2+ on the right side and 2+ on the left side  Pulmonary:      Effort: Pulmonary effort is normal  No respiratory distress  Musculoskeletal:      Right foot: Normal range of motion  No deformity  Left foot: Normal range of motion  No deformity  Feet:      Right foot:      Protective Sensation: 10 sites tested  10 sites sensed  Skin integrity: Erythema and dry skin present  No ulcer or callus  Toenail Condition: Right toenails are ingrown  Left foot:      Protective Sensation: 10 sites tested  10 sites sensed  Skin integrity: Dry skin present  No ulcer, erythema or callus  Skin:     Capillary Refill: Capillary refill takes less than 2 seconds  Neurological:      Mental Status: He is alert and oriented to person, place, and time  Sensory: No sensory deficit  Diabetic Foot Exam    Patient's shoes and socks removed  Right Foot/Ankle   Right Foot Inspection  Skin Exam: skin intact, dry skin and erythema  No callus, no ulcer and no callus  Toe Exam: ROM and strength within normal limits  No swelling, no tenderness, erythema and  no right toe deformity    Sensory   Vibration: intact  Monofilament testing: intact    Vascular  Capillary refills: < 3 seconds  The right DP pulse is 2+  The right PT pulse is 2+  Right Toe  - Comprehensive Exam  Arch: normal      Left Foot/Ankle  Left Foot Inspection  Skin Exam: skin intact and dry skin  No erythema, no ulcer and no callus  Toe Exam: ROM and strength within normal limits  No swelling, no tenderness, no erythema and no left toe deformity  Sensory   Vibration: intact  Monofilament testing: intact    Vascular  Capillary refills: < 3 seconds  The left DP pulse is 2+  The left PT pulse is 2+       Left Toe  - Comprehensive Exam  Arch: normal      Assign Risk Category  No deformity present  No loss of protective sensation  No weak pulses  Risk: 0

## 2023-07-28 ENCOUNTER — APPOINTMENT (OUTPATIENT)
Dept: LAB | Facility: MEDICAL CENTER | Age: 58
End: 2023-07-28

## 2023-07-28 ENCOUNTER — APPOINTMENT (OUTPATIENT)
Dept: URGENT CARE | Facility: MEDICAL CENTER | Age: 58
End: 2023-07-28

## 2023-07-28 DIAGNOSIS — Z02.1 PRE-EMPLOYMENT HEALTH SCREENING EXAMINATION: Primary | ICD-10-CM

## 2023-07-28 DIAGNOSIS — Z02.1 PRE-EMPLOYMENT HEALTH SCREENING EXAMINATION: ICD-10-CM

## 2023-07-28 PROCEDURE — 36415 COLL VENOUS BLD VENIPUNCTURE: CPT

## 2023-07-28 PROCEDURE — 86870 RBC ANTIBODY IDENTIFICATION: CPT

## 2023-07-28 PROCEDURE — 84202 ASSAY RBC PROTOPORPHYRIN: CPT

## 2023-07-31 LAB
LEAD BLD-MCNC: <1 UG/DL (ref 0–3.4)
ZPP RBC-MCNC: 30 UG/DL (ref 0–99)

## 2023-12-13 ENCOUNTER — OFFICE VISIT (OUTPATIENT)
Dept: PODIATRY | Facility: CLINIC | Age: 58
End: 2023-12-13
Payer: COMMERCIAL

## 2023-12-13 VITALS
BODY MASS INDEX: 29.12 KG/M2 | HEIGHT: 72 IN | DIASTOLIC BLOOD PRESSURE: 81 MMHG | SYSTOLIC BLOOD PRESSURE: 166 MMHG | HEART RATE: 96 BPM | WEIGHT: 215 LBS

## 2023-12-13 DIAGNOSIS — E11.9 TYPE 2 DIABETES MELLITUS WITHOUT COMPLICATION, WITHOUT LONG-TERM CURRENT USE OF INSULIN (HCC): Primary | ICD-10-CM

## 2023-12-13 PROCEDURE — 99213 OFFICE O/P EST LOW 20 MIN: CPT | Performed by: PODIATRIST

## 2023-12-13 NOTE — PATIENT INSTRUCTIONS
Foot Care for People with Diabetes   WHAT YOU NEED TO KNOW:   Long-term high blood sugar levels can damage the blood vessels and nerves in your legs and feet. This damage makes it hard to feel pressure, pain, temperature, and touch. You may not be able to feel a cut or sore, or shoes that are too tight. Foot care is needed to prevent serious problems, such as an infection or amputation. Diabetes may cause your toes to become crooked or curved under. These changes may affect the way you walk and can lead to increased pressure on your foot. The pressure can decrease blood flow to your feet. Lack of blood flow increases your risk for a foot ulcer. DISCHARGE INSTRUCTIONS:   Call your care team provider if:   Your feet become numb, weak, or hard to move. You have pus draining from a sore on your foot. You have a wound on your foot that gets bigger, deeper, or does not heal.    You see blisters, cuts, scratches, calluses, or sores on your foot. You have a fever, and your feet become red, warm, and swollen. Your toenails become thick, curled, or yellow. You find it hard to check your feet because your vision is poor. You have questions or concerns about your condition or care. Foot care:   Check your feet each day. Look at your whole foot, including the bottom, and between and under your toes. Check for wounds, corns, and calluses. Use a mirror to see the bottom of your feet. The skin on your feet may be shiny, tight, or darker than normal. Your feet may also be cold and pale. Feel your feet by running your hands along the tops, bottoms, sides, and between your toes. Redness, swelling, and warmth are signs of blood flow problems that can lead to a foot ulcer. Do not try to remove corns or calluses yourself. Do not ignore small problems, such as dry skin or small wounds. These can become life-threatening over time without proper care. Wash your feet each day with soap and warm water.   Do not use hot water, because this can injure your foot. Dry your feet gently with a towel after you wash them. Dry between and under your toes. Apply lotion or a moisturizer on your dry feet. Ask your care team provider what lotions are best to use. Do not put lotion or moisturizer between your toes. Moisture between your toes could lead to skin breakdown. Cut your toenails correctly. File or cut your toenails straight across. Use a soft brush to clean around your toenails. If your toenails are very thick, you may need to have a care team provider or specialist cut them. Protect your feet. Do not walk barefoot or wear your shoes without socks. Check your shoes for rocks or other objects that can hurt your feet. Wear cotton socks to help keep your feet dry. Wear socks without toe seams, or wear them with the seams inside out. Change your socks each day. Do not wear socks that are dirty or damp. Wear shoes that fit well. Wear shoes that do not rub against any area of your feet. Your shoes should be ½ to ¾ inch (1 to 2 centimeters) longer than your feet. Your shoes should also have extra space around the widest part of your feet. Walking or athletic shoes with laces or straps that adjust are best. Ask your care team provider for help to choose shoes that fit you best. Ask your provider if you need to wear an insert, orthotic, or bandage on your feet. Do not smoke. Smoking can damage your blood vessels and put you at increased risk for foot ulcers. Ask your care team provider for information if you currently smoke and need help to quit. E-cigarettes or smokeless tobacco still contain nicotine. Talk to your care team provider before you use these products. Follow up with your diabetes care team provider or foot specialist as directed: You will need to have your feet checked at least 1 time each year. You may need a foot exam more often if you have nerve damage, foot deformities, or ulcers.  Write down your questions so you remember to ask them during your visits. © Copyright [employer] 2023 Information is for End User's use only and may not be sold, redistributed or otherwise used for commercial purposes. The above information is an  only. It is not intended as medical advice for individual conditions or treatments. Talk to your doctor, nurse or pharmacist before following any medical regimen to see if it is safe and effective for you.

## 2023-12-13 NOTE — PROGRESS NOTES
Assessment/Plan:       Diagnoses and all orders for this visit:    Type 2 diabetes mellitus without complication, without long-term current use of insulin ([facility])        Diagnosis and options discussed with patient  Patient agreeable to the plan as stated below    -DM foot risk is low. Recommend annual DM foot exam  -Discussed DM risk to lower extremities, proper shoe gear, and daily monitoring of feet.   -Discussed weight loss and suitable exercise regiment.   -Educated on A1C and the risks of poorly controlled Diabetes. Reviewed recent A1C:  Lab Results   Component Value Date    HGBA1C 7.2 (H) 08/21/2023   . Subjective:      Patient ID: Tremaine Dumont is a 60 y.o. male. Annual DM foot exam. No history of neuropathy. He A1C historically well controlled. No acute concerns with hie feet. The following portions of the patient's history were reviewed and updated as appropriate: allergies, current medications, past family history, past medical history, past social history, past surgical history, and problem list.    Review of Systems    As stated in HPI    Objective:      /81   Pulse 96   Ht 6' (1.829 m)   Wt 97.5 kg (215 lb)   BMI 29.16 kg/m²          Physical Exam  Vitals reviewed. Cardiovascular:      Pulses: Normal pulses. no weak pulses          Dorsalis pedis pulses are 2+ on the right side and 2+ on the left side. Posterior tibial pulses are 2+ on the right side and 2+ on the left side. Musculoskeletal:         General: No deformity. Feet:      Right foot:      Skin integrity: No ulcer, skin breakdown, erythema, warmth, callus or dry skin. Left foot:      Skin integrity: No ulcer, skin breakdown, erythema, warmth, callus or dry skin. Skin:     Findings: No erythema. Neurological:      Mental Status: He is alert and oriented to person, place, and time. Sensory: No sensory deficit. Diabetic Foot Exam    Patient's shoes and socks removed.     Right Foot/Ankle   Right Foot Inspection  Skin Exam: skin normal and skin intact. No dry skin, no warmth, no callus, no erythema, no maceration, no abnormal color, no pre-ulcer, no ulcer and no callus. Toe Exam: No swelling and  no right toe deformity    Sensory   Vibration: intact  Proprioception: intact  Monofilament testing: intact    Vascular  Capillary refills: < 3 seconds  The right DP pulse is 2+. The right PT pulse is 2+. Left Foot/Ankle  Left Foot Inspection  Skin Exam: skin normal and skin intact. No dry skin, no warmth, no erythema, no maceration, normal color, no pre-ulcer, no ulcer and no callus. Toe Exam: No swelling and no left toe deformity. Sensory   Vibration: intact  Proprioception: intact  Monofilament testing: intact    Vascular  Capillary refills: < 3 seconds  The left DP pulse is 2+. The left PT pulse is 2+.      Assign Risk Category  No deformity present  No loss of protective sensation  No weak pulses  Risk: 0

## 2024-12-11 ENCOUNTER — OFFICE VISIT (OUTPATIENT)
Dept: PODIATRY | Facility: CLINIC | Age: 59
End: 2024-12-11
Payer: COMMERCIAL

## 2024-12-11 DIAGNOSIS — E11.9 TYPE 2 DIABETES MELLITUS WITHOUT COMPLICATION, WITHOUT LONG-TERM CURRENT USE OF INSULIN (HCC): Primary | ICD-10-CM

## 2024-12-11 PROCEDURE — 99213 OFFICE O/P EST LOW 20 MIN: CPT | Performed by: PODIATRIST

## 2024-12-11 RX ORDER — CLOBETASOL PROPIONATE 0.5 MG/ML
SOLUTION TOPICAL
COMMUNITY
Start: 2024-11-06

## 2024-12-11 RX ORDER — KETOCONAZOLE 20 MG/ML
SHAMPOO, SUSPENSION TOPICAL
COMMUNITY
Start: 2024-11-06

## 2024-12-11 RX ORDER — CEPHALEXIN 500 MG/1
1 CAPSULE ORAL 4 TIMES DAILY
COMMUNITY
Start: 2024-09-23

## 2024-12-11 RX ORDER — BETAMETHASONE DIPROPIONATE 0.5 MG/G
OINTMENT, AUGMENTED TOPICAL
COMMUNITY
Start: 2024-11-06

## 2024-12-11 RX ORDER — SITAGLIPTIN AND METFORMIN HYDROCHLORIDE 500; 50 MG/1; MG/1
1 TABLET, FILM COATED ORAL 2 TIMES DAILY WITH MEALS
COMMUNITY
Start: 2024-12-10 | End: 2025-12-10

## 2024-12-11 NOTE — ASSESSMENT & PLAN NOTE
Diagnosis and options discussed with patient  Patient agreeable to the plan as stated below    -DM foot risk is low. Recommend annual DM foot exam  -Discussed DM risk to lower extremities, proper shoe gear, and daily monitoring of feet.   -Discussed weight loss and suitable exercise regiment.   -Reviewed most recent PCP visit on 12/10/2024 through [facility]. Janumet prescribed, recheck A1C in 3 months. He had been off his medication which is why the BG went up.   -Educated on A1C and the risks of poorly controlled Diabetes. Reviewed recent A1C:  Lab Results   Component Value Date    HGBA1C 9.8 (H) 12/03/2024   .     Lab Results   Component Value Date    HGBA1C 9.8 (H) 12/03/2024

## 2024-12-11 NOTE — PROGRESS NOTES
Name: Leobardo Elena      : 1965      MRN: [id number]  Encounter Provider: Roberto Weir DPM  Encounter Date: 2024   Encounter department: [facility] PODIATRY [facility]  :  Assessment & Plan  Type 2 diabetes mellitus without complication, without long-term current use of insulin ([facility])  Diagnosis and options discussed with patient  Patient agreeable to the plan as stated below    -DM foot risk is low. Recommend annual DM foot exam  -Discussed DM risk to lower extremities, proper shoe gear, and daily monitoring of feet.   -Discussed weight loss and suitable exercise regiment.   -Reviewed most recent PCP visit on 12/10/2024 through [facility]. Janumet prescribed, recheck A1C in 3 months. He had been off his medication which is why the BG went up.   -Educated on A1C and the risks of poorly controlled Diabetes. Reviewed recent A1C:  Lab Results   Component Value Date    HGBA1C 9.8 (H) 2024   .     Lab Results   Component Value Date    HGBA1C 9.8 (H) 2024              History of Present Illness   HPI  Leobardo Elena is a 59 y.o. male who presents for annual DM foot exam, he reports no significant changes to his feet. Recently his A1C has gone up to 9.8. He had not been on medication., His PCP yesterday started him on Janumet. He's getting a small callus on his right foot.       Review of Systems  As stated in HPI, otherwise normal    Medical History Reviewed by provider this encounter:  Tobacco  Allergies  Meds  Problems  Med Hx  Surg Hx  Fam Hx           Objective   There were no vitals taken for this visit.     Physical Exam  Vitals reviewed.   Constitutional:       General: He is not in acute distress.  Cardiovascular:      Rate and Rhythm: Normal rate.      Pulses: Normal pulses. no weak pulses.           Dorsalis pedis pulses are 2+ on the right side and 2+ on the left side.        Posterior tibial pulses are 2+ on the right side and 2+ on the left side.   Pulmonary:       Effort: Pulmonary effort is normal. No respiratory distress.   Musculoskeletal:         General: No deformity.      Right foot: Normal range of motion. No deformity.      Left foot: Normal range of motion. No deformity.   Feet:      Right foot:      Protective Sensation: 10 sites tested.  10 sites sensed.      Skin integrity: Skin integrity normal. No ulcer, skin breakdown, erythema, warmth, callus or dry skin.      Toenail Condition: Right toenails are normal.      Left foot:      Protective Sensation: 10 sites tested.  10 sites sensed.      Skin integrity: Skin integrity normal. No ulcer, skin breakdown, erythema, warmth, callus or dry skin.      Toenail Condition: Left toenails are normal.   Skin:     Capillary Refill: Capillary refill takes less than 2 seconds.      Findings: No erythema.   Neurological:      Mental Status: He is alert and oriented to person, place, and time.      Sensory: No sensory deficit.      Gait: Gait normal.     Diabetic Foot Exam    Patient's shoes and socks removed.    Right Foot/Ankle   Right Foot Inspection  Skin Exam: skin normal and skin intact. No dry skin, no warmth, no callus, no erythema, no maceration, no abnormal color, no pre-ulcer, no ulcer and no callus.     Toe Exam: ROM and strength within normal limits. No tenderness and  no right toe deformity    Sensory   Vibration: intact  Proprioception: intact  Monofilament testing: intact    Vascular  Capillary refills: < 3 seconds  The right DP pulse is 2+. The right PT pulse is 2+.     Left Foot/Ankle  Left Foot Inspection  Skin Exam: skin normal and skin intact. No dry skin, no warmth, no erythema, no maceration, normal color, no pre-ulcer, no ulcer and no callus.     Toe Exam: ROM and strength within normal limits. No tenderness and no left toe deformity.     Sensory   Vibration: intact  Proprioception: intact  Monofilament testing: intact    Vascular  Capillary refills: < 3 seconds  The left DP pulse is 2+. The left PT  pulse is 2+.     Assign Risk Category  No deformity present  No loss of protective sensation  No weak pulses  Risk: 0